# Patient Record
Sex: FEMALE | Race: WHITE | NOT HISPANIC OR LATINO | URBAN - METROPOLITAN AREA
[De-identification: names, ages, dates, MRNs, and addresses within clinical notes are randomized per-mention and may not be internally consistent; named-entity substitution may affect disease eponyms.]

---

## 2020-07-25 ENCOUNTER — TELEPHONE ENCOUNTER (OUTPATIENT)
Dept: URBAN - METROPOLITAN AREA CLINIC 13 | Facility: CLINIC | Age: 70
End: 2020-07-25

## 2020-07-25 RX ORDER — POLYETHYLENE GLYCOL 3350, SODIUM CHLORIDE, SODIUM BICARBONATE AND POTASSIUM CHLORIDE WITH LEMON FLAVOR 420; 11.2; 5.72; 1.48 G/4L; G/4L; G/4L; G/4L
DRINK 32 OUNCES AT 5PM DAY BEFORE PROCEDURE AND 6 HOURS PRIOR POWDER, FOR SOLUTION ORAL
Qty: 1 | Refills: 0 | OUTPATIENT
Start: 2013-06-05 | End: 2013-06-21

## 2020-07-25 RX ORDER — POLYETHYLENE GLYCOL 3350, SODIUM CHLORIDE, SODIUM BICARBONATE AND POTASSIUM CHLORIDE WITH LEMON FLAVOR 420; 11.2; 5.72; 1.48 G/4L; G/4L; G/4L; G/4L
TAKE 1/2 GALLON AT 5:00 PM DAY BEFORE PROCEDURE, TAKE SECOND 1/2 OF GALLON 6 HRS PRIOR TO PROCEDURE POWDER, FOR SOLUTION ORAL
Qty: 1 | Refills: 0 | OUTPATIENT
Start: 2018-06-29 | End: 2018-07-27

## 2020-07-25 RX ORDER — CHOLESTYRAMINE 4 G/5.5G
PLACE CONTENTS OF 1 LEVEL SCOOPFUL IN GLASS.  ADD 6 OUNCES OF WATER.  STIR TO UNIFORM CONSISTENCY AND DRINK POWDER, FOR SUSPENSION ORAL
Refills: 0 | OUTPATIENT
End: 2013-08-23

## 2020-07-25 RX ORDER — PANTOPRAZOLE SODIUM 40 MG/1
TAKE 1 TABLET DAILY TABLET, DELAYED RELEASE ORAL
Qty: 30 | Refills: 11 | OUTPATIENT
Start: 2013-05-24 | End: 2013-06-21

## 2020-07-25 RX ORDER — CARISOPRODOL 350 MG/1
TAKE 1 TABLET 4 TIMES DAILY TABLET ORAL
Refills: 0 | OUTPATIENT
End: 2018-07-27

## 2020-07-25 RX ORDER — POLYETHYLENE GLYCOL 3350, SODIUM CHLORIDE, SODIUM BICARBONATE AND POTASSIUM CHLORIDE WITH LEMON FLAVOR 420; 11.2; 5.72; 1.48 G/4L; G/4L; G/4L; G/4L
USE AS DIRECTED POWDER, FOR SOLUTION ORAL
Qty: 1 | Refills: 0 | OUTPATIENT
Start: 2013-05-28 | End: 2013-06-21

## 2020-07-25 RX ORDER — BIOTIN 10 MG
TAKE 1 TABLET DAILY STATES UNKNOWN DOSE TABLET ORAL
Refills: 0 | OUTPATIENT
End: 2018-07-27

## 2020-07-25 RX ORDER — OXYCODONE AND ACETAMINOPHEN 10; 325 MG/1; MG/1
TAKE 1 TABLET EVERY 4 HOURS AS NEEDED FOR PAIN TABLET ORAL
Refills: 0 | OUTPATIENT
End: 2018-07-27

## 2020-07-26 ENCOUNTER — TELEPHONE ENCOUNTER (OUTPATIENT)
Dept: URBAN - METROPOLITAN AREA CLINIC 13 | Facility: CLINIC | Age: 70
End: 2020-07-26

## 2020-07-26 RX ORDER — SIMVASTATIN 40 MG/1
TABLET, FILM COATED ORAL
Refills: 0 | Status: ACTIVE | COMMUNITY
Start: 2013-10-29

## 2020-07-26 RX ORDER — MULTIVIT-MIN/FA/LYCOPEN/LUTEIN .4-300-25
TAKE 1 TABLET DAILY TABLET ORAL
Refills: 0 | Status: ACTIVE | COMMUNITY

## 2020-07-26 RX ORDER — BACLOFEN 10 MG/1
TABLET ORAL
Refills: 0 | Status: ACTIVE | COMMUNITY
Start: 2013-10-26

## 2020-07-26 RX ORDER — CITALOPRAM 20 MG/1
TAKE 1 TABLET BEDTIME TABLET, FILM COATED ORAL
Refills: 0 | Status: ACTIVE | COMMUNITY

## 2020-07-26 RX ORDER — METRONIDAZOLE 500 MG/1
TABLET ORAL
Refills: 0 | Status: ACTIVE | COMMUNITY
Start: 2013-06-04

## 2020-07-26 RX ORDER — DIPHENOXYLATE HYDROCHLORIDE AND ATROPINE SULFATE 2.5; .025 MG/1; MG/1
TABLET ORAL
Refills: 0 | Status: ACTIVE | COMMUNITY
Start: 2013-06-04

## 2020-07-26 RX ORDER — HYDROMORPHONE HYDROCHLORIDE 8 MG/1
TAKE 1 TABLET EVERY 4 HOURS AS NEEDED TABLET ORAL
Refills: 0 | Status: ACTIVE | COMMUNITY
Start: 2018-06-29

## 2020-07-26 RX ORDER — ASPIRIN 81 MG/1
TAKE 1 TABLET DAILY TABLET, DELAYED RELEASE ORAL
Refills: 0 | Status: ACTIVE | COMMUNITY
Start: 2018-06-29

## 2023-03-29 ENCOUNTER — OFFICE VISIT (OUTPATIENT)
Dept: URBAN - METROPOLITAN AREA CLINIC 107 | Facility: CLINIC | Age: 73
End: 2023-03-29

## 2023-04-19 ENCOUNTER — LAB OUTSIDE AN ENCOUNTER (OUTPATIENT)
Dept: URBAN - METROPOLITAN AREA CLINIC 107 | Facility: CLINIC | Age: 73
End: 2023-04-19

## 2023-04-19 ENCOUNTER — WEB ENCOUNTER (OUTPATIENT)
Dept: URBAN - METROPOLITAN AREA CLINIC 107 | Facility: CLINIC | Age: 73
End: 2023-04-19

## 2023-04-19 ENCOUNTER — OFFICE VISIT (OUTPATIENT)
Dept: URBAN - METROPOLITAN AREA CLINIC 107 | Facility: CLINIC | Age: 73
End: 2023-04-19
Payer: COMMERCIAL

## 2023-04-19 VITALS
RESPIRATION RATE: 18 BRPM | DIASTOLIC BLOOD PRESSURE: 68 MMHG | HEIGHT: 68 IN | HEART RATE: 67 BPM | BODY MASS INDEX: 23.19 KG/M2 | WEIGHT: 153 LBS | TEMPERATURE: 98.7 F | SYSTOLIC BLOOD PRESSURE: 107 MMHG

## 2023-04-19 DIAGNOSIS — Z86.010 HISTORY OF ADENOMATOUS POLYP OF COLON: ICD-10-CM

## 2023-04-19 PROCEDURE — 99203 OFFICE O/P NEW LOW 30 MIN: CPT | Performed by: NURSE PRACTITIONER

## 2023-04-19 RX ORDER — ROSUVASTATIN CALCIUM 10 MG/1
1 TABLET TABLET, FILM COATED ORAL ONCE A DAY
Status: ACTIVE | COMMUNITY

## 2023-04-19 RX ORDER — ESCITALOPRAM OXALATE 20 MG/1
1 TABLET TABLET, FILM COATED ORAL ONCE A DAY
Status: ACTIVE | COMMUNITY

## 2023-04-19 RX ORDER — DIPHENOXYLATE HYDROCHLORIDE AND ATROPINE SULFATE 2.5; .025 MG/1; MG/1
TABLET ORAL
Refills: 0 | Status: DISCONTINUED | COMMUNITY
Start: 2013-06-04

## 2023-04-19 RX ORDER — GABAPENTIN 100 MG/1
1 CAPSULE CAPSULE ORAL
Status: ACTIVE | COMMUNITY

## 2023-04-19 RX ORDER — METRONIDAZOLE 500 MG/1
TABLET ORAL
Refills: 0 | Status: DISCONTINUED | COMMUNITY
Start: 2013-06-04

## 2023-04-19 RX ORDER — CHOLECALCIFEROL (VITAMIN D3) 50 MCG
1 TABLET TABLET ORAL ONCE A DAY
Status: ACTIVE | COMMUNITY

## 2023-04-19 RX ORDER — HYDROMORPHONE HYDROCHLORIDE 8 MG/1
TAKE 1 TABLET EVERY 4 HOURS AS NEEDED TABLET ORAL
Refills: 0 | Status: DISCONTINUED | COMMUNITY
Start: 2018-06-29

## 2023-04-19 RX ORDER — MULTIVIT-MIN/FA/LYCOPEN/LUTEIN .4-300-25
TAKE 1 TABLET DAILY TABLET ORAL
Refills: 0 | Status: ACTIVE | COMMUNITY

## 2023-04-19 RX ORDER — MULTIVIT-MIN/IRON/FOLIC ACID/K 18-600-40
AS DIRECTED CAPSULE ORAL
Status: ACTIVE | COMMUNITY

## 2023-04-19 RX ORDER — BACLOFEN 10 MG/1
TABLET ORAL
Refills: 0 | Status: DISCONTINUED | COMMUNITY
Start: 2013-10-26

## 2023-04-19 RX ORDER — ASPIRIN 81 MG/1
TAKE 1 TABLET DAILY TABLET, DELAYED RELEASE ORAL
Refills: 0 | Status: ACTIVE | COMMUNITY
Start: 2018-06-29

## 2023-04-19 RX ORDER — SIMVASTATIN 40 MG/1
TABLET, FILM COATED ORAL
Refills: 0 | Status: DISCONTINUED | COMMUNITY
Start: 2013-10-29

## 2023-04-19 RX ORDER — CLONAZEPAM 1 MG/1
1 TABLET TABLET ORAL ONCE A DAY
Status: ACTIVE | COMMUNITY

## 2023-04-19 RX ORDER — CITALOPRAM 20 MG/1
TAKE 1 TABLET BEDTIME TABLET, FILM COATED ORAL
Refills: 0 | Status: DISCONTINUED | COMMUNITY

## 2023-04-19 NOTE — HPI-TODAY'S VISIT:
74yo female with a history of hyperlipidemia, COPD, cerebellar ataxia, UGI bleed secondary to duodenal ulcer, chronic pancreatitis, lymphocytic colitis, colon polyps, referred back to our office by Dr. Josse Florian for colon cancer screening. Colonoscopy 7/27/18: 8mm sessile transverse colon polyp, 6mm sessile descending colon polyp, 3mm sessile ascending colon polyp, mild pancolonic diverticulosis, moderate internal hemorrhoids. Pathology showed tubular adenoma. She is doing well from a GI standpoint. No abdominal pain, nausea or vomiting. Her bowel habits are regular without blood per rectum. She has a history of repeat falls and was ultimately diagnosed with cerebellar ataxia. She is in a wheelchair and has day-time aides who assist her with ADLs. She lives with her daughter.

## 2023-05-29 ENCOUNTER — DASHBOARD ENCOUNTERS (OUTPATIENT)
Age: 73
End: 2023-05-29

## 2023-05-30 ENCOUNTER — OFFICE VISIT (OUTPATIENT)
Dept: URBAN - METROPOLITAN AREA MEDICAL CENTER 2 | Facility: MEDICAL CENTER | Age: 73
End: 2023-05-30
Payer: COMMERCIAL

## 2023-05-30 DIAGNOSIS — Z12.11 COLON CANCER SCREENING: ICD-10-CM

## 2023-05-30 PROCEDURE — 992 APS NON BILLABLE: Performed by: INTERNAL MEDICINE

## 2024-09-05 ENCOUNTER — OFFICE VISIT (OUTPATIENT)
Dept: URBAN - METROPOLITAN AREA CLINIC 113 | Facility: CLINIC | Age: 74
End: 2024-09-05

## 2024-09-11 ENCOUNTER — LAB OUTSIDE AN ENCOUNTER (OUTPATIENT)
Dept: URBAN - METROPOLITAN AREA CLINIC 107 | Facility: CLINIC | Age: 74
End: 2024-09-11

## 2024-09-11 ENCOUNTER — OFFICE VISIT (OUTPATIENT)
Dept: URBAN - METROPOLITAN AREA CLINIC 107 | Facility: CLINIC | Age: 74
End: 2024-09-11
Payer: COMMERCIAL

## 2024-09-11 VITALS
SYSTOLIC BLOOD PRESSURE: 84 MMHG | BODY MASS INDEX: 18.07 KG/M2 | DIASTOLIC BLOOD PRESSURE: 59 MMHG | HEIGHT: 68 IN | WEIGHT: 119.2 LBS | HEART RATE: 77 BPM | TEMPERATURE: 96.8 F | OXYGEN SATURATION: 97 %

## 2024-09-11 DIAGNOSIS — R10.84 GENERALIZED ABDOMINAL PAIN: ICD-10-CM

## 2024-09-11 DIAGNOSIS — R19.4 CHANGE IN BOWEL HABITS: ICD-10-CM

## 2024-09-11 DIAGNOSIS — R63.4 WEIGHT LOSS, UNINTENTIONAL: ICD-10-CM

## 2024-09-11 PROCEDURE — 99214 OFFICE O/P EST MOD 30 MIN: CPT | Performed by: NURSE PRACTITIONER

## 2024-09-11 RX ORDER — ESCITALOPRAM OXALATE 20 MG/1
1 TABLET TABLET, FILM COATED ORAL ONCE A DAY
Status: ACTIVE | COMMUNITY

## 2024-09-11 RX ORDER — ROSUVASTATIN 10 MG/1
1 TABLET TABLET, FILM COATED ORAL ONCE A DAY
Status: ACTIVE | COMMUNITY

## 2024-09-11 RX ORDER — MULTIVIT-MIN/FA/LYCOPEN/LUTEIN .4-300-25
TAKE 1 TABLET DAILY TABLET ORAL
Refills: 0 | Status: ACTIVE | COMMUNITY

## 2024-09-11 RX ORDER — GABAPENTIN 100 MG/1
1 CAPSULE CAPSULE ORAL
Status: ON HOLD | COMMUNITY

## 2024-09-11 RX ORDER — ASPIRIN 81 MG/1
TAKE 1 TABLET DAILY TABLET, DELAYED RELEASE ORAL
Refills: 0 | Status: ACTIVE | COMMUNITY
Start: 2018-06-29

## 2024-09-11 RX ORDER — CLONAZEPAM 1 MG/1
1 TABLET TABLET ORAL ONCE A DAY
Status: ACTIVE | COMMUNITY

## 2024-09-11 RX ORDER — MULTIVIT-MIN/IRON/FOLIC ACID/K 18-600-40
AS DIRECTED CAPSULE ORAL
Status: ACTIVE | COMMUNITY

## 2024-09-11 RX ORDER — CHOLECALCIFEROL (VITAMIN D3) 50 MCG
1 TABLET TABLET ORAL ONCE A DAY
Status: ACTIVE | COMMUNITY

## 2024-09-11 NOTE — HPI-TODAY'S VISIT:
75yo female with a history of hyperlipidemia, COPD, cerebellar ataxia, UGI bleed secondary to duodenal ulcer, chronic pancreatitis, lymphocytic colitis, colon polyps, referred back to our office by Dr. Florian for colon cancer screening.   She was seen in the office in April 2023 to discuss a colonoscopy for polyp surveillance, which was scheduled. She ultimately did not proceed with the procedure.  Previous colonoscopy 7/27/18: 8mm sessile transverse colon polyp, 6mm sessile descending colon polyp, 3mm sessile ascending colon polyp, mild pancolonic diverticulosis, moderate internal hemorrhoids. Pathology showed tubular adenoma. She returns today with complaint of a recent exacerbation of diarrhea, which has been ongoing for the last 2 to 3 weeks.  She is having loose stools with any oral intake, at least 3 times per day.  She reports associated generalized abdominal discomfort and excessive gas.  No nocturnal defecation or blood in stool.  She has lost 10 to 15 pounds unintentionally since the time of symptom onset.  She denies any new medications or recent antibiotic use.

## 2024-09-17 ENCOUNTER — TELEPHONE ENCOUNTER (OUTPATIENT)
Dept: URBAN - METROPOLITAN AREA CLINIC 107 | Facility: CLINIC | Age: 74
End: 2024-09-17

## 2024-09-24 ENCOUNTER — OFFICE VISIT (OUTPATIENT)
Dept: URBAN - METROPOLITAN AREA CLINIC 107 | Facility: CLINIC | Age: 74
End: 2024-09-24
Payer: COMMERCIAL

## 2024-09-24 VITALS
BODY MASS INDEX: 17.31 KG/M2 | HEIGHT: 68 IN | TEMPERATURE: 97.5 F | HEART RATE: 81 BPM | RESPIRATION RATE: 18 BRPM | SYSTOLIC BLOOD PRESSURE: 92 MMHG | OXYGEN SATURATION: 96 % | WEIGHT: 114.2 LBS | DIASTOLIC BLOOD PRESSURE: 56 MMHG

## 2024-09-24 DIAGNOSIS — R19.7 DIARRHEA: ICD-10-CM

## 2024-09-24 DIAGNOSIS — R63.4 WEIGHT LOSS, UNINTENTIONAL: ICD-10-CM

## 2024-09-24 DIAGNOSIS — Z86.010 HISTORY OF ADENOMATOUS POLYP OF COLON: ICD-10-CM

## 2024-09-24 PROCEDURE — 99214 OFFICE O/P EST MOD 30 MIN: CPT | Performed by: NURSE PRACTITIONER

## 2024-09-24 RX ORDER — MULTIVIT-MIN/IRON/FOLIC ACID/K 18-600-40
AS DIRECTED CAPSULE ORAL
Status: ACTIVE | COMMUNITY

## 2024-09-24 RX ORDER — GABAPENTIN 100 MG/1
1 CAPSULE CAPSULE ORAL
Status: ON HOLD | COMMUNITY

## 2024-09-24 RX ORDER — ESCITALOPRAM OXALATE 20 MG/1
1 TABLET TABLET, FILM COATED ORAL ONCE A DAY
Status: ACTIVE | COMMUNITY

## 2024-09-24 RX ORDER — CLONAZEPAM 1 MG/1
1 TABLET TABLET ORAL ONCE A DAY
Status: ACTIVE | COMMUNITY

## 2024-09-24 RX ORDER — MELOXICAM 15 MG/1
1 TABLET TABLET ORAL ONCE A DAY
Status: ACTIVE | COMMUNITY
Start: 2024-09-11

## 2024-09-24 RX ORDER — MULTIVIT-MIN/FA/LYCOPEN/LUTEIN .4-300-25
TAKE 1 TABLET DAILY TABLET ORAL
Refills: 0 | Status: ACTIVE | COMMUNITY

## 2024-09-24 RX ORDER — ASPIRIN 81 MG/1
TAKE 1 TABLET DAILY TABLET, DELAYED RELEASE ORAL
Refills: 0 | Status: ACTIVE | COMMUNITY
Start: 2018-06-29

## 2024-09-24 RX ORDER — ROSUVASTATIN 10 MG/1
1 TABLET TABLET, FILM COATED ORAL ONCE A DAY
Status: ACTIVE | COMMUNITY

## 2024-09-24 RX ORDER — CHOLECALCIFEROL (VITAMIN D3) 50 MCG
1 TABLET TABLET ORAL ONCE A DAY
Status: ACTIVE | COMMUNITY

## 2024-09-24 RX ORDER — TRAMADOL HYDROCHLORIDE 50 MG/1
1 TABLET AS NEEDED TABLET, FILM COATED ORAL ONCE A DAY
Status: ACTIVE | COMMUNITY
Start: 2024-09-11

## 2024-09-24 RX ORDER — CHOLESTYRAMINE 4 G/9G
1 SCOOP POWDER, FOR SUSPENSION ORAL ONCE A DAY
Qty: 120 GRAM | Refills: 3 | OUTPATIENT
Start: 2024-09-24

## 2024-09-24 NOTE — HPI-TODAY'S VISIT:
75yo female with a history of hyperlipidemia, COPD, cerebellar ataxia, UGI bleed secondary to duodenal ulcer, chronic pancreatitis, lymphocytic colitis, colon polyps, here for a follow-up.    She was seen in the office in 2023 to discuss a colonoscopy for polyp surveillance, which was scheduled. She ultimately did not proceed with the procedure.  Previous colonoscopy 18: 8mm sessile transverse colon polyp, 6mm sessile descending colon polyp, 3mm sessile ascending colon polyp, mild pancolonic diverticulosis, moderate internal hemorrhoids. Pathology showed tubular adenoma. She returns today with complaint of a recent exacerbation of diarrhea, which has been ongoing for the last 2 to 3 weeks.  She is having loose stools with any oral intake, at least 3 times per day.  She reports associated generalized abdominal discomfort and excessive gas.  No nocturnal defecation or blood in stool.  She has lost 10 to 15 pounds unintentionally since the time of symptom onset.  She denies any new medications or recent antibiotic use.  Last seen 2024 for colonoscopy consult with unintentional weight loss, diarrhea, altered bowel habits and generalized abdominal pain.  Provided with stool DX kit for further evaluation, CT abdomen and pelvis ordered.  Consider colonoscopy with random colon biopsies.  Scheduling will depend on stool study results.  Interval history, 2024 Labs 2024. CBC normal with Hgb 12.7, WBC 4.9. Sed rate elevated 97. CMP revealed glucose of 43.  She completed the stool kit on the .  They called [facility], who unfortunately did not come to pick it up until the next day.  Per Stool DX by the time they received the kit it had .  She has a new kit coming to the house today.  She reports diarrhea unchanged.  No abdominal pain. Has mucous in her stools, Has 4 BM's a day, has mucous. No melena or hematochezia. Has tried Imodium without improvement.   Weight is down 5 pounds from her last appointment   Had shoulder replacement surgery scheduled for 10/16

## 2024-09-25 ENCOUNTER — OFFICE VISIT (OUTPATIENT)
Dept: URBAN - METROPOLITAN AREA CLINIC 107 | Facility: CLINIC | Age: 74
End: 2024-09-25

## 2024-10-01 ENCOUNTER — TELEPHONE ENCOUNTER (OUTPATIENT)
Dept: URBAN - METROPOLITAN AREA CLINIC 107 | Facility: CLINIC | Age: 74
End: 2024-10-01

## 2024-10-01 ENCOUNTER — LAB OUTSIDE AN ENCOUNTER (OUTPATIENT)
Dept: URBAN - METROPOLITAN AREA CLINIC 107 | Facility: CLINIC | Age: 74
End: 2024-10-01

## 2024-10-01 RX ORDER — SODIUM, POTASSIUM,MAG SULFATES 17.5-3.13G
AS DIRECTED SOLUTION, RECONSTITUTED, ORAL ORAL
Qty: 1 KIT | Refills: 0 | OUTPATIENT
Start: 2024-10-01

## 2024-10-02 ENCOUNTER — TELEPHONE ENCOUNTER (OUTPATIENT)
Dept: URBAN - METROPOLITAN AREA SURGERY CENTER 25 | Facility: SURGERY CENTER | Age: 74
End: 2024-10-02

## 2024-10-03 ENCOUNTER — OFFICE VISIT (OUTPATIENT)
Dept: URBAN - METROPOLITAN AREA SURGERY CENTER 25 | Facility: SURGERY CENTER | Age: 74
End: 2024-10-03

## 2024-10-21 ENCOUNTER — TELEPHONE ENCOUNTER (OUTPATIENT)
Dept: URBAN - METROPOLITAN AREA CLINIC 113 | Facility: CLINIC | Age: 74
End: 2024-10-21

## 2024-10-21 ENCOUNTER — LAB OUTSIDE AN ENCOUNTER (OUTPATIENT)
Dept: URBAN - METROPOLITAN AREA CLINIC 113 | Facility: CLINIC | Age: 74
End: 2024-10-21

## 2024-10-21 ENCOUNTER — OFFICE VISIT (OUTPATIENT)
Dept: URBAN - METROPOLITAN AREA MEDICAL CENTER 2 | Facility: MEDICAL CENTER | Age: 74
End: 2024-10-21

## 2024-10-21 RX ORDER — CHOLESTYRAMINE 4 G/9G
1 SCOOP POWDER, FOR SUSPENSION ORAL ONCE A DAY
Qty: 120 GRAM | Refills: 3 | Status: ACTIVE | COMMUNITY
Start: 2024-09-24

## 2024-10-21 RX ORDER — SODIUM, POTASSIUM,MAG SULFATES 17.5-3.13G
AS DIRECTED SOLUTION, RECONSTITUTED, ORAL ORAL
Qty: 1 KIT | Refills: 0 | Status: ACTIVE | COMMUNITY
Start: 2024-10-01

## 2024-10-21 RX ORDER — MELOXICAM 15 MG/1
1 TABLET TABLET ORAL ONCE A DAY
Status: ACTIVE | COMMUNITY
Start: 2024-09-11

## 2024-10-21 RX ORDER — TRAMADOL HYDROCHLORIDE 50 MG/1
1 TABLET AS NEEDED TABLET, FILM COATED ORAL ONCE A DAY
Status: ACTIVE | COMMUNITY
Start: 2024-09-11

## 2024-10-21 RX ORDER — MULTIVIT-MIN/FA/LYCOPEN/LUTEIN .4-300-25
TAKE 1 TABLET DAILY TABLET ORAL
Refills: 0 | Status: ACTIVE | COMMUNITY

## 2024-10-21 RX ORDER — ASPIRIN 81 MG/1
TAKE 1 TABLET DAILY TABLET, DELAYED RELEASE ORAL
Refills: 0 | Status: ACTIVE | COMMUNITY
Start: 2018-06-29

## 2024-10-21 RX ORDER — GABAPENTIN 100 MG/1
1 CAPSULE CAPSULE ORAL
Status: ON HOLD | COMMUNITY

## 2024-10-21 RX ORDER — CLONAZEPAM 1 MG/1
1 TABLET TABLET ORAL ONCE A DAY
Status: ACTIVE | COMMUNITY

## 2024-10-21 RX ORDER — CHOLECALCIFEROL (VITAMIN D3) 50 MCG
1 TABLET TABLET ORAL ONCE A DAY
Status: ACTIVE | COMMUNITY

## 2024-10-21 RX ORDER — ROSUVASTATIN 10 MG/1
1 TABLET TABLET, FILM COATED ORAL ONCE A DAY
Status: ACTIVE | COMMUNITY

## 2024-10-21 RX ORDER — ESCITALOPRAM OXALATE 20 MG/1
1 TABLET TABLET, FILM COATED ORAL ONCE A DAY
Status: ACTIVE | COMMUNITY

## 2024-10-21 RX ORDER — MULTIVIT-MIN/IRON/FOLIC ACID/K 18-600-40
AS DIRECTED CAPSULE ORAL
Status: ACTIVE | COMMUNITY

## 2024-10-24 ENCOUNTER — OFFICE VISIT (OUTPATIENT)
Dept: URBAN - METROPOLITAN AREA CLINIC 107 | Facility: CLINIC | Age: 74
End: 2024-10-24

## 2024-10-25 ENCOUNTER — TELEPHONE ENCOUNTER (OUTPATIENT)
Dept: URBAN - METROPOLITAN AREA CLINIC 113 | Facility: CLINIC | Age: 74
End: 2024-10-25

## 2024-10-25 RX ORDER — BUDESONIDE 3 MG/1
3 CAPSULES CAPSULE ORAL ONCE A DAY
Qty: 90 CAPSULE | Refills: 3 | OUTPATIENT
Start: 2024-10-25

## 2024-10-28 ENCOUNTER — TELEPHONE ENCOUNTER (OUTPATIENT)
Dept: URBAN - METROPOLITAN AREA CLINIC 107 | Facility: CLINIC | Age: 74
End: 2024-10-28

## 2024-11-01 ENCOUNTER — TELEPHONE ENCOUNTER (OUTPATIENT)
Dept: URBAN - METROPOLITAN AREA CLINIC 107 | Facility: CLINIC | Age: 74
End: 2024-11-01

## 2024-11-11 ENCOUNTER — OFFICE VISIT (OUTPATIENT)
Dept: URBAN - METROPOLITAN AREA CLINIC 113 | Facility: CLINIC | Age: 74
End: 2024-11-11
Payer: COMMERCIAL

## 2024-11-11 VITALS
HEART RATE: 81 BPM | HEIGHT: 68 IN | DIASTOLIC BLOOD PRESSURE: 59 MMHG | RESPIRATION RATE: 20 BRPM | SYSTOLIC BLOOD PRESSURE: 99 MMHG | TEMPERATURE: 97.9 F

## 2024-11-11 DIAGNOSIS — R63.4 WEIGHT LOSS: ICD-10-CM

## 2024-11-11 DIAGNOSIS — R10.84 GENERALIZED ABDOMINAL PAIN: ICD-10-CM

## 2024-11-11 DIAGNOSIS — K52.831 COLLAGENOUS COLITIS: ICD-10-CM

## 2024-11-11 DIAGNOSIS — Z86.0101 H/O ADENOMATOUS POLYP OF COLON: ICD-10-CM

## 2024-11-11 PROCEDURE — 99214 OFFICE O/P EST MOD 30 MIN: CPT | Performed by: INTERNAL MEDICINE

## 2024-11-11 RX ORDER — CLONAZEPAM 1 MG/1
1 TABLET TABLET ORAL ONCE A DAY
Status: ACTIVE | COMMUNITY

## 2024-11-11 RX ORDER — ROSUVASTATIN 10 MG/1
1 TABLET TABLET, FILM COATED ORAL ONCE A DAY
Status: ACTIVE | COMMUNITY

## 2024-11-11 RX ORDER — MULTIVIT-MIN/FA/LYCOPEN/LUTEIN .4-300-25
TAKE 1 TABLET DAILY TABLET ORAL
Refills: 0 | Status: ACTIVE | COMMUNITY

## 2024-11-11 RX ORDER — TRAMADOL HYDROCHLORIDE 50 MG/1
1 TABLET AS NEEDED TABLET, FILM COATED ORAL ONCE A DAY
Status: ACTIVE | COMMUNITY
Start: 2024-09-11

## 2024-11-11 RX ORDER — ASPIRIN 81 MG/1
TAKE 1 TABLET DAILY TABLET, DELAYED RELEASE ORAL
Refills: 0 | Status: ACTIVE | COMMUNITY
Start: 2018-06-29

## 2024-11-11 RX ORDER — BUDESONIDE 3 MG/1
3 CAPSULES CAPSULE ORAL ONCE A DAY
Qty: 90 CAPSULE | Refills: 3 | Status: ACTIVE | COMMUNITY
Start: 2024-10-25

## 2024-11-11 RX ORDER — MULTIVIT-MIN/IRON/FOLIC ACID/K 18-600-40
AS DIRECTED CAPSULE ORAL
Status: ACTIVE | COMMUNITY

## 2024-11-11 RX ORDER — MELOXICAM 15 MG/1
1 TABLET TABLET ORAL ONCE A DAY
Status: ACTIVE | COMMUNITY
Start: 2024-09-11

## 2024-11-11 RX ORDER — CHOLECALCIFEROL (VITAMIN D3) 50 MCG
1 TABLET TABLET ORAL ONCE A DAY
Status: ACTIVE | COMMUNITY

## 2024-11-11 RX ORDER — ESCITALOPRAM OXALATE 20 MG/1
1 TABLET TABLET, FILM COATED ORAL ONCE A DAY
Status: ACTIVE | COMMUNITY

## 2024-11-11 NOTE — HPI-TODAY'S VISIT:
75yo female with a history of hyperlipidemia, COPD, cerebellar ataxia, UGI bleed secondary to duodenal ulcer, chronic pancreatitis, lymphocytic colitis, colon polyps, here for a follow-up.    She is not having major problems today. She states that her diarrhea is much improved. No rectal bleeding or melena. No dysphagia heartburn or regurgitation. Her caregiver is with her. Main concern has been the weight loss. There is been no appreciable increase in weight since her last office evaluation. She was started on budesonide about 2 to 3 weeks ago. There has been complete resolution of the diarrhea. Now she is somewhat constipated.  Colonoscopy October 21, 2024. 5 mm sessile ascending colon polyp removed by cold snare. Moderate sigmoid diverticulosis. Random biopsies were obtained. Tortuous sigmoid colon. Pathology was notable for ascending colonic tubular adenoma. Collagenous colitis was notable in the ascending colon and transverse colon. Paneth cell metaplasia and chronic inflammation was notable in the descending and sigmoid colon biopsies.  Labs. October 2024. Hemoglobin 13.7, MCV 92, platelet count 316K, creatinine 0.6, total bilirubin 0.4, AST 26, ALT 17, TSH 1.2  Labs 9/13/2024. CBC normal with Hgb 12.7, WBC 4.9. Sed rate elevated 97. CMP normal  CT scan. Abdomen pelvis. With contrast. October 2024. No acute abdominal findings. Constipation noted.  Labs. September 2024. CMP normal, CBC normal. Sed rate 97  Stool studies. September 2024. Fecal fat normal. Increased fatty acids were noted. GI pathogen panel was negative. Calprotectin was high at 533. Pancreatic elastase was 241.  Labs. August 2024. CBC normal. CMP normal  Previous colonoscopy 7/27/18: 8mm sessile transverse colon polyp, 6mm sessile descending colon polyp, 3mm sessile ascending colon polyp, mild pancolonic diverticulosis, moderate internal hemorrhoids. Pathology showed tubular adenoma.  Colonoscopy.  June 2013.  2 distinct 3 mm sessile rectal polyps.  Random biopsies were obtained.  Random biopsies were notable for collagenous colitis.  The biopsies of the colon polyps were notable for adenomas.  EGD.  2013.  Mild nonerosive gastritis.  Biopsy was negative for H. pylori.  EGD 2011.  12 mm duodenal bulb ulcer.  Gastritis noted..  Biopsies were notable for H. pylori.

## 2024-11-13 ENCOUNTER — OFFICE VISIT (OUTPATIENT)
Dept: URBAN - METROPOLITAN AREA CLINIC 107 | Facility: CLINIC | Age: 74
End: 2024-11-13

## 2024-11-13 RX ORDER — CLONAZEPAM 1 MG/1
1 TABLET TABLET ORAL ONCE A DAY
Status: ACTIVE | COMMUNITY

## 2024-11-13 RX ORDER — ESCITALOPRAM OXALATE 20 MG/1
1 TABLET TABLET, FILM COATED ORAL ONCE A DAY
Status: ACTIVE | COMMUNITY

## 2024-11-13 RX ORDER — ASPIRIN 81 MG/1
TAKE 1 TABLET DAILY TABLET, DELAYED RELEASE ORAL
Refills: 0 | Status: ACTIVE | COMMUNITY
Start: 2018-06-29

## 2024-11-13 RX ORDER — BUDESONIDE 3 MG/1
3 CAPSULES CAPSULE ORAL ONCE A DAY
Qty: 90 CAPSULE | Refills: 3 | Status: ACTIVE | COMMUNITY
Start: 2024-10-25

## 2024-11-13 RX ORDER — MULTIVIT-MIN/IRON/FOLIC ACID/K 18-600-40
AS DIRECTED CAPSULE ORAL
Status: ACTIVE | COMMUNITY

## 2024-11-13 RX ORDER — MULTIVIT-MIN/FA/LYCOPEN/LUTEIN .4-300-25
TAKE 1 TABLET DAILY TABLET ORAL
Refills: 0 | Status: ACTIVE | COMMUNITY

## 2024-11-13 RX ORDER — ROSUVASTATIN 10 MG/1
1 TABLET TABLET, FILM COATED ORAL ONCE A DAY
Status: ACTIVE | COMMUNITY

## 2024-11-13 RX ORDER — MELOXICAM 15 MG/1
1 TABLET TABLET ORAL ONCE A DAY
Status: ACTIVE | COMMUNITY
Start: 2024-09-11

## 2024-11-13 RX ORDER — CHOLECALCIFEROL (VITAMIN D3) 50 MCG
1 TABLET TABLET ORAL ONCE A DAY
Status: ACTIVE | COMMUNITY

## 2024-11-13 RX ORDER — TRAMADOL HYDROCHLORIDE 50 MG/1
1 TABLET AS NEEDED TABLET, FILM COATED ORAL ONCE A DAY
Status: ACTIVE | COMMUNITY
Start: 2024-09-11

## 2024-11-13 NOTE — HPI-TODAY'S VISIT:
73yo female with a history of hyperlipidemia, COPD, cerebellar ataxia, UGI bleed secondary to duodenal ulcer, chronic pancreatitis, lymphocytic colitis, colon polyps, here for a follow-up.    She was seen in the office in April 2023 to discuss a colonoscopy for polyp surveillance, which was scheduled. She ultimately did not proceed with the procedure.  She returns today with complaint of a recent exacerbation of diarrhea, which has been ongoing for the last 2 to 3 weeks.  She is having loose stools with any oral intake, at least 3 times per day.  She reports associated generalized abdominal discomfort and excessive gas.  No nocturnal defecation or blood in stool.  She has lost 10 to 15 pounds unintentionally since the time of symptom onset.  She denies any new medications or recent antibiotic use.  Last seen 9/11/2024 for colonoscopy consult with unintentional weight loss, diarrhea, altered bowel habits and generalized abdominal pain.  Provided with stool DX kit for further evaluation, CT abdomen and pelvis ordered.  Consider colonoscopy with random colon biopsies.  Scheduling will depend on stool study results.  Interval history, 9/24/2024 Labs 9/13/2024. CBC normal with Hgb 12.7, WBC 4.9. Sed rate elevated 97. CMP revealed glucose of 43.  Colonoscopy 7/27/18: 8mm sessile transverse colon polyp, 6mm sessile descending colon polyp, 3mm sessile ascending colon polyp, mild pancolonic diverticulosis, moderate internal hemorrhoids. Pathology showed tubular adenoma.

## 2024-12-16 ENCOUNTER — TELEPHONE ENCOUNTER (OUTPATIENT)
Dept: URBAN - METROPOLITAN AREA CLINIC 113 | Facility: CLINIC | Age: 74
End: 2024-12-16

## 2025-03-11 ENCOUNTER — OFFICE VISIT (OUTPATIENT)
Dept: URBAN - METROPOLITAN AREA CLINIC 113 | Facility: CLINIC | Age: 75
End: 2025-03-11
Payer: MEDICARE

## 2025-03-11 VITALS
SYSTOLIC BLOOD PRESSURE: 121 MMHG | HEIGHT: 68 IN | BODY MASS INDEX: 19.55 KG/M2 | TEMPERATURE: 97.8 F | RESPIRATION RATE: 18 BRPM | DIASTOLIC BLOOD PRESSURE: 55 MMHG | HEART RATE: 77 BPM | WEIGHT: 129 LBS

## 2025-03-11 DIAGNOSIS — K52.831 COLLAGENOUS COLITIS: ICD-10-CM

## 2025-03-11 DIAGNOSIS — R63.4 WEIGHT LOSS: ICD-10-CM

## 2025-03-11 DIAGNOSIS — R10.84 GENERALIZED ABDOMINAL PAIN: ICD-10-CM

## 2025-03-11 DIAGNOSIS — Z86.0101 H/O ADENOMATOUS POLYP OF COLON: ICD-10-CM

## 2025-03-11 PROCEDURE — 99214 OFFICE O/P EST MOD 30 MIN: CPT | Performed by: INTERNAL MEDICINE

## 2025-03-11 RX ORDER — MULTIVIT-MIN/FA/LYCOPEN/LUTEIN .4-300-25
TAKE 1 TABLET DAILY TABLET ORAL
Refills: 0 | Status: ACTIVE | COMMUNITY

## 2025-03-11 RX ORDER — TIMOLOL MALEATE 5 MG/ML
SOLUTION/ DROPS OPHTHALMIC
Qty: 15 MILLILITER | Status: ACTIVE | COMMUNITY

## 2025-03-11 RX ORDER — CHOLECALCIFEROL (VITAMIN D3) 50 MCG
1 TABLET TABLET ORAL ONCE A DAY
Status: ACTIVE | COMMUNITY

## 2025-03-11 RX ORDER — BUDESONIDE 3 MG/1
3 CAPSULES CAPSULE ORAL ONCE A DAY
Qty: 90 CAPSULE | Refills: 3 | Status: ACTIVE | COMMUNITY
Start: 2024-10-25

## 2025-03-11 RX ORDER — ESCITALOPRAM OXALATE 20 MG/21
TABLET, FILM COATED ORAL
Qty: 90 EACH | Refills: 1 | Status: ACTIVE | COMMUNITY

## 2025-03-11 RX ORDER — TRAMADOL HYDROCHLORIDE 50 MG/1
1 TABLET AS NEEDED TABLET, FILM COATED ORAL ONCE A DAY
Status: ACTIVE | COMMUNITY
Start: 2024-09-11

## 2025-03-11 RX ORDER — ROSUVASTATIN 10 MG/1
1 TABLET TABLET, FILM COATED ORAL ONCE A DAY
Status: ACTIVE | COMMUNITY

## 2025-03-11 RX ORDER — CLONAZEPAM 1 MG/1
1 TABLET TABLET ORAL ONCE A DAY
Status: ACTIVE | COMMUNITY

## 2025-03-11 RX ORDER — MIRTAZAPINE 15 MG/1
TABLET, FILM COATED ORAL
Qty: 30 EACH | Refills: 1 | Status: ACTIVE | COMMUNITY

## 2025-03-11 RX ORDER — BRIMONIDINE TARTRATE 2 MG/ML
SOLUTION/ DROPS OPHTHALMIC
Qty: 15 MILLILITER | Refills: 0 | Status: ACTIVE | COMMUNITY

## 2025-03-11 NOTE — HPI-TODAY'S VISIT:
76yo female with a history of hyperlipidemia, COPD, cerebellar ataxia, UGI bleed secondary to duodenal ulcer, chronic pancreatitis, lymphocytic colitis, colon polyps, here for a follow-up.    She is much better at this time. She is not having issues with dysphagia heartburn or regurgitation. No abdominal pain. Diarrhea is resolved after budesonide therapy. No rectal bleeding or melena. No diarrhea. No reports of nausea vomiting. She has gained 20 pounds after therapy for her collagenous colitis.  Interval history, 9/24/2024 Labs 9/13/2024. CBC normal with Hgb 12.7, WBC 4.9. Sed rate elevated 97. CMP revealed glucose of 43.  Colonoscopy 7/27/18: 8mm sessile transverse colon polyp, 6mm sessile descending colon polyp, 3mm sessile ascending colon polyp, mild pancolonic diverticulosis, moderate internal hemorrhoids. Pathology showed tubular adenoma.

## 2025-03-11 NOTE — PHYSICAL EXAM NEUROLOGIC:
oriented to person, place and time , normal sensation , short and long term memory intact
(0) independent